# Patient Record
Sex: FEMALE | Race: WHITE
[De-identification: names, ages, dates, MRNs, and addresses within clinical notes are randomized per-mention and may not be internally consistent; named-entity substitution may affect disease eponyms.]

---

## 2017-04-10 ENCOUNTER — HOSPITAL ENCOUNTER (OUTPATIENT)
Dept: HOSPITAL 45 - C.PAPS | Age: 57
Discharge: HOME | End: 2017-04-10
Attending: OBSTETRICS & GYNECOLOGY
Payer: COMMERCIAL

## 2017-04-10 DIAGNOSIS — Z12.4: Primary | ICD-10-CM

## 2017-04-10 DIAGNOSIS — Z87.42: ICD-10-CM

## 2017-04-28 ENCOUNTER — HOSPITAL ENCOUNTER (OUTPATIENT)
Dept: HOSPITAL 45 - C.MAMM | Age: 57
Discharge: HOME | End: 2017-04-28
Attending: OBSTETRICS & GYNECOLOGY
Payer: COMMERCIAL

## 2017-04-28 DIAGNOSIS — N63: Primary | ICD-10-CM

## 2017-04-28 NOTE — MAMMOGRAPHY REPORT
BILATERAL DIGITAL SCREENING MAMMOGRAM WITH CAD: 4/28/2017

CLINICAL HISTORY: Routine screening.  Patient has no complaints.  





TECHNIQUE:  Current study was also evaluated with a Computer Aided Detection (CAD) system.  Bilatera
l CC and MLO views and left X CCL views were obtained.



COMPARISON: Comparison is made to exams dated:  4/23/2015 mammogram, 4/25/2016 mammogram, 4/18/2014 
mammogram, 4/11/2013 mammogram, 4/5/2012 mammogram, and 4/1/2011 mammogram - [facility]
enter.   



BREAST COMPOSITION:  The tissue of both breasts is heterogeneously dense, which may obscure small ma
sses.  



FINDINGS:  No suspicious masses, calcifications, or areas of architectural distortion are noted in e
ither breast. There has been no significant interval change compared to prior exams.  





IMPRESSION:  ACR BI-RADS CATEGORY 1: NEGATIVE

There is no mammographic evidence of malignancy. A 1 year screening mammogram is recommended.  The p
atient will receive written notification of the results.  





Approximately 10% of breast cancers are not detected with mammography. A negative mammographic repor
t should not delay biopsy if a clinically suggestive mass is present.



Virginia Worthington M.D.          

ah/:4/28/2017 07:40:19  



Imaging Technologist: Dara GARRISON(R)(M), [facility]

letter sent: Normal 1/2  

BI-RADS Code: ACR BI-RADS Category 1: Negative

## 2018-05-02 ENCOUNTER — HOSPITAL ENCOUNTER (OUTPATIENT)
Dept: HOSPITAL 45 - C.MAMM | Age: 58
Discharge: HOME | End: 2018-05-02
Attending: OBSTETRICS & GYNECOLOGY
Payer: COMMERCIAL

## 2018-05-02 DIAGNOSIS — Z12.31: Primary | ICD-10-CM

## 2018-05-02 NOTE — MAMMOGRAPHY REPORT
BILATERAL DIGITAL SCREENING MAMMOGRAM TOMOSYNTHESIS WITH CAD: 5/2/2018

CLINICAL HISTORY: Routine screening.  





TECHNIQUE:  Breast tomosynthesis in addition to standard 2D mammography was performed. Current study 
was also evaluated with a Computer Aided Detection (CAD) system.  



COMPARISON: Comparison is made to exams dated:  4/28/2017 mammogram, 4/25/2016 mammogram, 4/23/2015 m
ammogram, 4/18/2014 mammogram, 4/11/2013 mammogram, and 4/5/2012 mammogram - [facility]
nter.   



BREAST COMPOSITION:  The tissue of both breasts is heterogeneously dense, which may obscure small mas
ses.  



FINDINGS:  The parenchymal pattern is unchanged. No developing mass, architectural distortion or clus
ter of suspicious microcalcifications is seen in either breast.  





IMPRESSION:  ACR BI-RADS CATEGORY 2: BENIGN

There is no mammographic evidence of malignancy. A 1 year screening mammogram is recommended.  The pa
tient will receive written notification of the results.  





Approximately 10% of breast cancers are not detected with mammography. A negative mammographic report
 should not delay biopsy if a clinically suggestive mass is present.



Billie Vazquez M.D.          

ay/:5/2/2018 07:50:42  



Imaging Technologist: Dara GARRISON(EULA)(ELENA), [facility]

letter sent: Normal 1/2  

BI-RADS Code: ACR BI-RADS Category 2: Benign